# Patient Record
Sex: MALE | Race: WHITE
[De-identification: names, ages, dates, MRNs, and addresses within clinical notes are randomized per-mention and may not be internally consistent; named-entity substitution may affect disease eponyms.]

---

## 2017-04-17 ENCOUNTER — HOSPITAL ENCOUNTER (EMERGENCY)
Dept: HOSPITAL 56 - MW.ED | Age: 62
Discharge: NURSING, REHAB OR LONG-TERM CARE | End: 2017-04-17
Payer: COMMERCIAL

## 2017-04-17 VITALS — SYSTOLIC BLOOD PRESSURE: 127 MMHG | DIASTOLIC BLOOD PRESSURE: 84 MMHG

## 2017-04-17 DIAGNOSIS — Z79.82: ICD-10-CM

## 2017-04-17 DIAGNOSIS — I21.4: Primary | ICD-10-CM

## 2017-04-17 DIAGNOSIS — E78.00: ICD-10-CM

## 2017-04-17 DIAGNOSIS — I48.91: ICD-10-CM

## 2017-04-17 DIAGNOSIS — Z79.899: ICD-10-CM

## 2017-04-17 LAB
CHLORIDE SERPL-SCNC: 108 MMOL/L (ref 98–110)
SODIUM SERPL-SCNC: 142 MMOL/L (ref 136–146)

## 2017-04-17 PROCEDURE — 80053 COMPREHEN METABOLIC PANEL: CPT

## 2017-04-17 PROCEDURE — 99285 EMERGENCY DEPT VISIT HI MDM: CPT

## 2017-04-17 PROCEDURE — 93005 ELECTROCARDIOGRAM TRACING: CPT

## 2017-04-17 PROCEDURE — 71010: CPT

## 2017-04-17 PROCEDURE — 84484 ASSAY OF TROPONIN QUANT: CPT

## 2017-04-17 PROCEDURE — 85610 PROTHROMBIN TIME: CPT

## 2017-04-17 PROCEDURE — 96374 THER/PROPH/DIAG INJ IV PUSH: CPT

## 2017-04-17 PROCEDURE — 36415 COLL VENOUS BLD VENIPUNCTURE: CPT

## 2017-04-17 PROCEDURE — 96372 THER/PROPH/DIAG INJ SC/IM: CPT

## 2017-04-17 PROCEDURE — 85025 COMPLETE CBC W/AUTO DIFF WBC: CPT

## 2017-04-17 RX ADMIN — NITROGLYCERIN SCH MG: 0.4 TABLET SUBLINGUAL at 19:19

## 2017-04-17 RX ADMIN — METOROPROLOL TARTRATE ONE: 5 INJECTION, SOLUTION INTRAVENOUS at 19:46

## 2017-04-17 RX ADMIN — NITROGLYCERIN SCH MG: 0.4 TABLET SUBLINGUAL at 19:25

## 2017-04-17 RX ADMIN — METOROPROLOL TARTRATE ONE MG: 5 INJECTION, SOLUTION INTRAVENOUS at 19:42

## 2017-04-17 RX ADMIN — NITROGLYCERIN SCH MG: 0.4 TABLET SUBLINGUAL at 19:12

## 2017-04-17 NOTE — EDM.PDOC
ED HPI GENERAL MEDICAL PROBLEM





- General


Chief Complaint: Chest Pain


Stated Complaint: PT HAS CHEST PAINS


Time Seen by Provider: 04/17/17 19:05





- History of Present Illness


INITIAL COMMENTS - FREE TEXT/NARRATIVE: 


HISTORY AND PHYSICAL:





History of present illness:


The patient is a 62-year-old male with history of high cholesterol and A. fib 

was been seen by cardiology at [facility] in [place] in the past for his 

atrial fibrillation and presents with midsternal chest pain pressure that 

started at 12 noon today. He says it's been coming and going and became more 

severe so he came in to be seen. He's having some shortness of breath but no 

lightheadedness no nausea no diaphoresis. He denies any trauma or recent upper 

respiratory infections. He has no abdominal pain. The patient is on metoprolol 

and Norpace for his A. fib. He rates the pain currently as 5/10 and it is not 

radiating. He points to his lower sternum as the area of discomfort. Patient 

has never had a heart catheter in the past and has no known coronary artery 

disease. He also has a history of hypercholesterolemia.





Review of systems: 


As per history of present illness and below otherwise all systems reviewed and 

negative.





Past medical history: 


As per history of present illness and as reviewed below otherwise 

noncontributory.





Surgical history: 


As per history of present illness and as reviewed below otherwise 

noncontributory.





Social history: 


No reported history of drug or alcohol abuse.





Family history: 


As per history of present illness and as reviewed below otherwise 

noncontributory.





Physical exam:


General: Well-developed well-nourished male who is nontoxic but looks 

uncomfortable and vital signs are noted by me. 


HEENT: Atraumatic normocephalic, pupils reactive, negative for conjunctival 

pallor or scleral icterus, mucous membranes moist, throat clear, neck supple, 

nontender, trachea midline.


Lungs: Clear to auscultation, breath sounds equal bilaterally, chest nontender. 

No work or breathing


Heart: S1S2, irregular and tachycardic on my evaluation initially, negative for 

clicks, rubs, or JVD.


Abdomen: Soft, nondistended, nontender. Negative for masses or 

hepatosplenomegaly. Negative for costovertebral tenderness.


Pelvis: Stable nontender.


Genitourinary: Deferred.


Rectal: Deferred.


Extremities: Atraumatic, negative for cords or calf pain. Neurovascular 

unremarkable. No pedal edema or leg asymmetry


Neuro: Awake, alert, oriented. Cranial nerves II through XII unremarkable. 

Cerebellum unremarkable. Motor and sensory unremarkable throughout. Exam 

nonfocal.





Diagnostics:


EKG x2 portable chest x-ray CBC CMP INR troponin





Therapeutics:


IV O2 monitor nitroglycerin sublingual aspirin Lopressor





1909: Our cardiologist Dr. PAGAN was contacted and the EKG was sent to him after 

obtaining permission from the patient. He has reviewed it and would like to 

slow the rate and reevaluate with a second EKG. he states is not completely 

clear that this is an acute injury from this initial EKG. He also at 1914 

recontacted me and asked if we could do an EKG looking at leads in V7 through 

V9. We are currently in the process of getting respiratory to do that.


1924: Dr. PAGAN is aware that we're having difficulties getting the EKG he is 

requesting so he is coming in to see the patient. Currently the patient states 

his pain is a 2/10 after one nitroglycerin and his heart rate is in the low 

100s. We will continue with the plan


1932: Repeat EKG shows normalization of the ST depression in V2 and V3 and 

slight increase in the sweep of the ST segment inferiorly but overall much 

improved. I've discussed the case with cardiology on call at [facility], 

Dr. Crespo. He is except the patient for direct admission to the ICU. He 

would like me to not to give any Plavix but only Lovenox 1 mg per kilogram, he 

would like nitro drip and 5 mg more of Lopressor. I've inform the patient that 

he will be transferred and he and wife are in agreement and concur. The patient 

currently is pain-free.


1944: Currently the patient's heart rate is 49-50 and she did not receive the 

second dose of 5 mg of Lopressor. He is currently pain-free and feels more 

relaxed. I will do a third EKG and we will attempt to do the posterior EKG


1945: Dr. PAGAN her cardiologist is here and is reviewing the 2 EKGs and we are 

currently doing the third. The patient's heart rate is still holding in the 

high 40s low 50s. He is not lightheaded or dizzy. We will arrange for flight 

team to come and take the patient to [facility]. Dr. PAGAN agrees that the 

patient should be flown to [facility].


1950: 30 EKG shows a sinus bradycardia with no ST segment changes but Dr. PAGAN 

still feels that the patient should be flown to  for acute 

management. Portable chest x-ray is currently being performed and I will review 

that and send the films to [place]


Impression: 


Acute non-STEMI MI with history of A. fib, 





Definitive disposition and diagnosis as appropriate pending reevaluation and 

review of above.


  ** Chest


Pain Score (Numeric/FACES): 5





- Related Data


 Allergies











Allergy/AdvReac Type Severity Reaction Status Date / Time


 


No Known Allergies Allergy   Verified 07/27/16 15:43











Home Meds: 


 Home Meds





Aspirin 162.5 mg PO DAILY 07/27/16 [History]


Metoprolol Tartrate 12.5 mg PO BEDTIME 07/27/16 [History]


Multivitamin [Daily Multiple Vitamin] 1 tab PO DAILY 07/27/16 [History]


Omega-3 Fatty Acids [Fish Oil] 1 cap PO DAILY 07/27/16 [History]


Simvastatin [Zocor] 20 mg PO BEDTIME 07/27/16 [History]


Disopyramide Phosphate 200 mg PO BID 04/17/17 [History]











Past Medical History


Other HEENT History: wears glasses


Cardiovascular History: Reports: Afib, High cholesterol


Other Cardiovascular History: has "attack" of A-Fib about once a month, lasts 

30 seconds to 1 minute


Respiratory History: Reports: Sleep apnea


Other Respiratory History: uses Bi-PAP


Gastrointestinal History: Reports: GERD


Genitourinary History: Reports: None


Musculoskeletal History: Reports: Back pain, chronic, Fracture


Neurological History: Reports: None


Psychiatric History: Reports: None


Endocrine/Metabolic History: Reports: None


Hematologic History: Reports: None


Immunologic History: Reports: None


Oncologic (Cancer) History: Reports: None


Dermatologic History: Reports: None





- Past Surgical History


Head Surgeries/Procedures: Reports: None


GI Surgical History: Reports: None


Male  Surgical History: Reports: None


Endocrine Surgical History: Reports: None


Neurological Surgical History: Reports: None


Other Musculoskeletal Surgeries/Procedures:: left leg


Oncologic Surgical History: Reports: None


Dermatological Surgical History: Reports: None





Social & Family History





- Tobacco Use


Smoking Status *Q: Never Smoker





- Recreational Drug Use


Recreational Drug Use: No


Drug Use in Last 12 Months: No





ED ROS GENERAL





- Review of Systems


Review Of Systems: ROS reveals no pertinent complaints other than HPI.





ED EXAM, GENERAL





- Physical Exam


Exam: See Below (See dictation)





Course





- Vital Signs


Last Recorded V/S: 


 Last Vital Signs











Temp  37.4 C   04/17/17 19:40


 


Pulse  87   04/17/17 19:42


 


Resp  20   04/17/17 19:40


 


BP  127/74   04/17/17 19:42


 


Pulse Ox  97   04/17/17 19:40














- Orders/Labs/Meds


Orders: 


 Active Orders 24 hr











 Category Date Time Status


 


 Cardiac Monitoring [RC] .AS DIRECTED Care  04/17/17 19:03 Active


 


 EKG Documentation Completion [RC] STAT Care  04/17/17 19:03 Active


 


 EKG Documentation Completion [RC] STAT Care  04/17/17 19:26 Active


 


 Oxygen Therapy, ED [RC] ASDIRECTED Care  04/17/17 19:03 Active


 


 Pulse Oximetry [RC] ASDIRECTED Care  04/17/17 19:03 Active


 


 Chest 1V Frontal [CR] Stat Exams  04/17/17 19:03 Ordered


 


 Nitroglycerin/D5W [Nitroglycerin  25 MG/D5W 250 ML] 250 Med  04/17/17 19:45 

Ordered





 ml   





 IV TITRATE   


 


 Sodium Chloride 0.9% [Saline Flush] Med  04/17/17 19:03 Active





 10 ml FLUSH ASDIRECTED PRN   


 


 Sodium Chloride 0.9% [Saline Flush] Med  04/17/17 19:03 Active





 2.5 ml FLUSH ASDIRECTED PRN   


 


 Saline Lock Insert [OM.PC] Stat Oth  04/17/17 19:03 Ordered








 Medication Orders





Nitroglycerin/Dextrose (Nitroglycerin  25 Mg/D5w 250 Ml)  250 mls @ 3 mls/hr IV 

TITRATE JOSE


   PRN Reason: 5 MCG/MIN


Sodium Chloride (Saline Flush)  10 ml FLUSH ASDIRECTED PRN


   PRN Reason: Keep Vein Open


Sodium Chloride (Saline Flush)  2.5 ml FLUSH ASDIRECTED PRN


   PRN Reason: Keep Vein Open








Labs: 


 Laboratory Tests











  04/17/17 04/17/17 04/17/17 Range/Units





  19:00 19:00 19:00 


 


WBC  7.42    (4.0-11.0)  K/uL


 


RBC  5.31    (4.50-5.90)  M/uL


 


Hgb  16.1    (13.0-17.0)  g/dL


 


Hct  44.9    (38.0-50.0)  %


 


MCV  84.6    (80.0-98.0)  fL


 


MCH  30.3    (27.0-32.0)  pg


 


MCHC  35.9    (31.0-37.0)  g/dL


 


RDW Std Deviation  39.7    (28.0-62.0)  fl


 


RDW Coeff of Rosalba  13    (11.0-15.0)  %


 


Plt Count  145 L    (150-400)  K/uL


 


MPV  11.40    (7.40-12.00)  fL


 


Neut % (Auto)  62.2    (48.0-80.0)  %


 


Lymph % (Auto)  28.4    (16.0-40.0)  %


 


Mono % (Auto)  7.5    (0.0-15.0)  %


 


Eos % (Auto)  1.8    (0.0-7.0)  %


 


Baso % (Auto)  0.1    (0.0-1.5)  %


 


Neut # (Auto)  4.6    (1.4-5.7)  K/uL


 


Lymph # (Auto)  2.1    (0.6-2.4)  K/uL


 


Mono # (Auto)  0.6    (0.0-0.8)  K/uL


 


Eos # (Auto)  0.1    (0.0-0.7)  K/uL


 


Baso # (Auto)  0.0    (0.0-0.1)  K/uL


 


Nucleated RBC %  0.0    /100WBC


 


Nucleated RBCs #  0    K/uL


 


INR   0.97   (0.86-1.11)  


 


Sodium    142  (136-146)  mmol/L


 


Potassium    3.7  (3.5-5.1)  mmol/L


 


Chloride    108  ()  mmol/L


 


Carbon Dioxide    23  (21-31)  mmol/L


 


BUN    16  (6.0-23.0)  mg/dL


 


Creatinine    1.1  (0.6-1.5)  mg/dL


 


Est Cr Clr Drug Dosing    81.99  mL/min


 


Estimated GFR (MDRD)    > 60.0  ml/min


 


Glucose    85  ()  mg/dL


 


Calcium    10.0  (8.8-10.8)  mg/dL


 


Total Bilirubin    0.8  (0.1-1.5)  mg/dL


 


AST    33  (5-40)  IU/L


 


ALT    56 H  (8-54)  IU/L


 


Alkaline Phosphatase    72  ()  


 


Troponin I     (0.0-0.29)  NG/ML


 


Total Protein    7.8  (6.0-8.0)  g/dL


 


Albumin    4.6  (3.4-4.8)  g/dL


 


Globulin    3.2  (2.0-3.5)  g/dL


 


Albumin/Globulin Ratio    1.4  (1.3-2.8)  














  04/17/17 Range/Units





  19:00 


 


WBC   (4.0-11.0)  K/uL


 


RBC   (4.50-5.90)  M/uL


 


Hgb   (13.0-17.0)  g/dL


 


Hct   (38.0-50.0)  %


 


MCV   (80.0-98.0)  fL


 


MCH   (27.0-32.0)  pg


 


MCHC   (31.0-37.0)  g/dL


 


RDW Std Deviation   (28.0-62.0)  fl


 


RDW Coeff of Rosalba   (11.0-15.0)  %


 


Plt Count   (150-400)  K/uL


 


MPV   (7.40-12.00)  fL


 


Neut % (Auto)   (48.0-80.0)  %


 


Lymph % (Auto)   (16.0-40.0)  %


 


Mono % (Auto)   (0.0-15.0)  %


 


Eos % (Auto)   (0.0-7.0)  %


 


Baso % (Auto)   (0.0-1.5)  %


 


Neut # (Auto)   (1.4-5.7)  K/uL


 


Lymph # (Auto)   (0.6-2.4)  K/uL


 


Mono # (Auto)   (0.0-0.8)  K/uL


 


Eos # (Auto)   (0.0-0.7)  K/uL


 


Baso # (Auto)   (0.0-0.1)  K/uL


 


Nucleated RBC %   /100WBC


 


Nucleated RBCs #   K/uL


 


INR   (0.86-1.11)  


 


Sodium   (136-146)  mmol/L


 


Potassium   (3.5-5.1)  mmol/L


 


Chloride   ()  mmol/L


 


Carbon Dioxide   (21-31)  mmol/L


 


BUN   (6.0-23.0)  mg/dL


 


Creatinine   (0.6-1.5)  mg/dL


 


Est Cr Clr Drug Dosing   mL/min


 


Estimated GFR (MDRD)   ml/min


 


Glucose   ()  mg/dL


 


Calcium   (8.8-10.8)  mg/dL


 


Total Bilirubin   (0.1-1.5)  mg/dL


 


AST   (5-40)  IU/L


 


ALT   (8-54)  IU/L


 


Alkaline Phosphatase   ()  


 


Troponin I  0.31 H*  (0.0-0.29)  NG/ML


 


Total Protein   (6.0-8.0)  g/dL


 


Albumin   (3.4-4.8)  g/dL


 


Globulin   (2.0-3.5)  g/dL


 


Albumin/Globulin Ratio   (1.3-2.8)  











Meds: 


Medications











Generic Name Dose Route Start Last Admin





  Trade Name Freq  PRN Reason Stop Dose Admin


 


Nitroglycerin/Dextrose  250 mls @ 3 mls/hr  04/17/17 19:45  





  Nitroglycerin  25 Mg/D5w 250 Ml  IV   





  TITRATE JOSE   





  5 MCG/MIN   


 


Sodium Chloride  10 ml  04/17/17 19:03  





  Saline Flush  FLUSH   





  ASDIRECTED PRN   





  Keep Vein Open   


 


Sodium Chloride  2.5 ml  04/17/17 19:03  





  Saline Flush  FLUSH   





  ASDIRECTED PRN   





  Keep Vein Open   














Discontinued Medications














Generic Name Dose Route Start Last Admin





  Trade Name Freq  PRN Reason Stop Dose Admin


 


Aspirin  324 mg  04/17/17 19:03  04/17/17 19:12





  Aspirin  PO  04/17/17 19:04  324 mg





  ONETIME ONE   Administration


 


Aspirin  Confirm  04/17/17 19:18  04/17/17 19:38





  Aspirin  Administered  04/17/17 19:19  Not Given





  Dose   





  81 mg   





  .ROUTE   





  .STK-MED ONE   


 


Enoxaparin Sodium  100 mg  04/17/17 19:37  04/17/17 19:42





  Lovenox  SUBCUT  04/17/17 19:38  100 mg





  ONETIME ONE   Administration


 


Metoprolol Tartrate  2.5 mg  04/17/17 19:14  04/17/17 19:22





  Lopressor  IVPUSH  04/17/17 19:15  2.5 mg





  ONETIME ONE   Administration


 


Metoprolol Tartrate  5 mg  04/17/17 19:37  04/17/17 19:42





  Lopressor  IVPUSH  04/17/17 19:38  5 mg





  ONETIME ONE   Administration


 


Nitroglycerin  0.4 mg  04/17/17 19:15  04/17/17 19:25





  Nitrostat  SL  04/17/17 19:26  0.4 mg





  Q5M JOSE   Administration














Departure





- Departure


Time of Disposition: 19:52


Disposition: DC/[facility]fer to [facility]


Condition: good


Clinical Impression: 


 Non-STEMI (non-ST elevated myocardial infarction)





Forms:  ED Department Discharge





- My Orders


Last 24 Hours: 


My Active Orders





04/17/17 19:03


Cardiac Monitoring [RC] .AS DIRECTED 


EKG Documentation Completion [RC] STAT 


Oxygen Therapy, ED [RC] ASDIRECTED 


Pulse Oximetry [RC] ASDIRECTED 


Chest 1V Frontal [CR] Stat 


Sodium Chloride 0.9% [Saline Flush]   10 ml FLUSH ASDIRECTED PRN 


Sodium Chloride 0.9% [Saline Flush]   2.5 ml FLUSH ASDIRECTED PRN 


Saline Lock Insert [OM.PC] Stat 





04/17/17 19:26


EKG Documentation Completion [RC] STAT 





04/17/17 19:45


Nitroglycerin/D5W [Nitroglycerin  25 MG/D5W 250 ML] 250 ml IV TITRATE 














- Assessment/Plan


Last 24 Hours: 


My Active Orders





04/17/17 19:03


Cardiac Monitoring [RC] .AS DIRECTED 


EKG Documentation Completion [RC] STAT 


Oxygen Therapy, ED [RC] ASDIRECTED 


Pulse Oximetry [RC] ASDIRECTED 


Chest 1V Frontal [CR] Stat 


Sodium Chloride 0.9% [Saline Flush]   10 ml FLUSH ASDIRECTED PRN 


Sodium Chloride 0.9% [Saline Flush]   2.5 ml FLUSH ASDIRECTED PRN 


Saline Lock Insert [OM.PC] Stat 





04/17/17 19:26


EKG Documentation Completion [RC] STAT 





04/17/17 19:45


Nitroglycerin/D5W [Nitroglycerin  25 MG/D5W 250 ML] 250 ml IV TITRATE

## 2017-04-18 NOTE — CR
EXAM DATE: 17



PATIENT'S AGE: 62





Patient: ISABELLE CABALLERO



Facility: [place], [place]

Patient ID: [id number]

Site Patient ID: [id number]

Site Accession #: [id number]

: 1955

Study: XRay Chest [id number]/17/2017 8:03:51 PM

Ordering Physician: AMINA



Final Report: 

HISTORY:

Chest pain.



Technique:

One view portable chest.



Comparison:

None.



Findings:

No airspace consolidation. No pleural effusion or pneumothorax. Pulmonary 
vasculature is within normal limits. Cardiomediastinal silhouette is within 
normal limits for technique.



Impression:

No acute findings.



Dictated by Freddy Madrigal MD @ 2017 8:07PM

(Electronic Signature)



Report Signed by Proxy and Original Signed Document filed in the

Medical Record.
MTDD

## 2018-04-22 ENCOUNTER — HOSPITAL ENCOUNTER (EMERGENCY)
Dept: HOSPITAL 56 - MW.ED | Age: 63
Discharge: HOME | End: 2018-04-22
Payer: COMMERCIAL

## 2018-04-22 VITALS — SYSTOLIC BLOOD PRESSURE: 111 MMHG | DIASTOLIC BLOOD PRESSURE: 71 MMHG

## 2018-04-22 DIAGNOSIS — E78.00: ICD-10-CM

## 2018-04-22 DIAGNOSIS — Z95.5: ICD-10-CM

## 2018-04-22 DIAGNOSIS — G47.30: ICD-10-CM

## 2018-04-22 DIAGNOSIS — M54.6: Primary | ICD-10-CM

## 2018-04-22 DIAGNOSIS — Z99.89: ICD-10-CM

## 2018-04-22 DIAGNOSIS — K21.9: ICD-10-CM

## 2018-04-22 DIAGNOSIS — Z79.899: ICD-10-CM

## 2018-04-22 DIAGNOSIS — R07.9: ICD-10-CM

## 2018-04-22 DIAGNOSIS — I48.91: ICD-10-CM

## 2018-04-22 DIAGNOSIS — Z79.82: ICD-10-CM

## 2018-04-22 LAB
CHLORIDE SERPL-SCNC: 110 MMOL/L (ref 98–107)
SODIUM SERPL-SCNC: 145 MMOL/L (ref 136–148)

## 2018-04-22 PROCEDURE — 85610 PROTHROMBIN TIME: CPT

## 2018-04-22 PROCEDURE — 84484 ASSAY OF TROPONIN QUANT: CPT

## 2018-04-22 PROCEDURE — 71045 X-RAY EXAM CHEST 1 VIEW: CPT

## 2018-04-22 PROCEDURE — 83690 ASSAY OF LIPASE: CPT

## 2018-04-22 PROCEDURE — 82553 CREATINE MB FRACTION: CPT

## 2018-04-22 PROCEDURE — 85025 COMPLETE CBC W/AUTO DIFF WBC: CPT

## 2018-04-22 PROCEDURE — 96361 HYDRATE IV INFUSION ADD-ON: CPT

## 2018-04-22 PROCEDURE — 82550 ASSAY OF CK (CPK): CPT

## 2018-04-22 PROCEDURE — 93005 ELECTROCARDIOGRAM TRACING: CPT

## 2018-04-22 PROCEDURE — 71260 CT THORAX DX C+: CPT

## 2018-04-22 PROCEDURE — 99284 EMERGENCY DEPT VISIT MOD MDM: CPT

## 2018-04-22 PROCEDURE — 96374 THER/PROPH/DIAG INJ IV PUSH: CPT

## 2018-04-22 PROCEDURE — 80053 COMPREHEN METABOLIC PANEL: CPT

## 2018-04-22 PROCEDURE — 74177 CT ABD & PELVIS W/CONTRAST: CPT

## 2018-04-22 RX ADMIN — NITROGLYCERIN PRN MG: 0.4 TABLET SUBLINGUAL at 06:39

## 2018-04-22 RX ADMIN — NITROGLYCERIN PRN MG: 0.4 TABLET SUBLINGUAL at 06:49

## 2018-04-22 RX ADMIN — NITROGLYCERIN PRN MG: 0.4 TABLET SUBLINGUAL at 06:44

## 2018-04-22 NOTE — EDM.PDOC
<Freddy Saucedo - Last Filed: 04/22/18 07:03>





ED HPI GENERAL MEDICAL PROBLEM





- General


Chief Complaint: Back Pain or Injury


Stated Complaint: BACK PAIN


Time Seen by Provider: 04/22/18 06:26


Source of Information: Reports: Patient





- History of Present Illness


INITIAL COMMENTS - FREE TEXT/NARRATIVE: 





HISTORY AND PHYSICAL:


History of present illness:


[Patient presents with 7 out of 10 chest pain radiating to his back he has 

history of atrial fibrillation post ablation last year as well as 6 stents 

placed last year, patient is quite anxious on arrival no safe association with 

shortness of breath or diaphoresis no radiation to arm neck or jaw





Patient had taken nitroglycerin at home With some improvement after first dose 

he did not take any subsequent dosing of nitroglycerin


]


Review of systems: 


As per history of present illness and below otherwise all systems reviewed and 

negative.


Past medical history: 


As per history of present illness and as reviewed below otherwise 

noncontributory.


Surgical history: 


As per history of present illness and as reviewed below otherwise 

noncontributory.


Social history: 


No reported history of drug or alcohol abuse.


Family history: 


As per history of present illness and as reviewed below otherwise 

noncontributory.





Physical exam:


HEENT: Atraumatic, normocephalic, pupils reactive, negative for conjunctival 

pallor or scleral icterus, mucous membranes moist, throat clear, neck supple, 

nontender, trachea midline.


Lungs: Clear to auscultation, breath sounds equal bilaterally, chest nontender.


Heart: S1S2, regular, negative for clicks, rubs, or JVD.


Abdomen: Soft, nondistended, nontender. Negative for masses or 

hepatosplenomegaly. Negative for costovertebral tenderness.


Pelvis: Stable nontender.


Genitourinary: Deferred.


Rectal: Deferred.


Extremities: Atraumatic, negative for cords or calf pain. Neurovascular 

unremarkable.


Neuro: Awake, alert, oriented. Cranial nerves II through XII unremarkable. 

Cerebellum unremarkable. Motor and sensory unremarkable throughout. Exam 

nonfocal.





Diagnostics:


[CBC CMP troponin lipase


EKG


Chest 1 view


]


Therapeutics:


 Normal saline 1 25 mL


[Patient had taken aspirin 325 mg 2 hours prior to arrival]


Nitroglycerin 0.4 sublingual every 5 minutes


Morphine 2 mg IV





Impression: 


[Chest pain


Chronic history at baseline]





Patient signed out to Dr. Boone at 7 AM follow lab and redirected and 

disposition


Definitive disposition and diagnosis as appropriate pending reevaluation and 

review of above.


  ** back pain


Pain Score (Numeric/FACES): 5





- Related Data


 Allergies











Allergy/AdvReac Type Severity Reaction Status Date / Time


 


No Known Allergies Allergy   Verified 04/22/18 06:25











Home Meds: 


 Home Meds





Aspirin 81 mg PO DAILY 07/27/16 [History]


Multivitamin [Daily Multiple Vitamin] 1 tab PO DAILY 07/27/16 [History]


Omega-3 Fatty Acids [Fish Oil] 1 cap PO DAILY 07/27/16 [History]


Nitroglycerin [Nitrostat] 1 tab SL ASDIRECTED PRN 04/22/18 [History]


Prasugrel [Effient] 10 mg PO DAILY 04/22/18 [History]


Ramipril 1 tab PO DAILY 04/22/18 [History]


Rosuvastatin [Crestor] 40 mg PO DAILY 04/22/18 [History]











Past Medical History


Other HEENT History: wears glasses


Cardiovascular History: Reports: Afib, High Cholesterol


Other Cardiovascular History: has "attack" of A-Fib about once a month, lasts 

30 seconds to 1 minute


Respiratory History: Reports: Sleep Apnea


Other Respiratory History: uses Bi-PAP


Gastrointestinal History: Reports: GERD


Genitourinary History: Reports: None


Musculoskeletal History: Reports: Back Pain, Chronic, Fracture


Neurological History: Reports: None


Psychiatric History: Reports: None


Endocrine/Metabolic History: Reports: None


Hematologic History: Reports: None


Immunologic History: Reports: None


Oncologic (Cancer) History: Reports: None


Dermatologic History: Reports: None





- Past Surgical History


Head Surgeries/Procedures: Reports: None


GI Surgical History: Reports: None


Male  Surgical History: Reports: None


Endocrine Surgical History: Reports: None


Neurological Surgical History: Reports: None


Other Musculoskeletal Surgeries/Procedures:: left leg


Oncologic Surgical History: Reports: None


Dermatological Surgical History: Reports: None





Social & Family History





- Tobacco Use


Smoking Status *Q: Never Smoker





- Recreational Drug Use


Recreational Drug Use: No


Drug Use in Last 12 Months: No





Course





- Vital Signs


Last Recorded V/S: 


 Last Vital Signs











Temp  36.0 C   04/22/18 08:55


 


Pulse  59 L  04/22/18 08:55


 


Resp  16   04/22/18 08:55


 


BP  102/59 L  04/22/18 08:55


 


Pulse Ox  97   04/22/18 08:55














- Orders/Labs/Meds


Orders: 


 Active Orders 24 hr











 Category Date Time Status


 


 EKG Documentation Completion [RC] STAT Care  04/22/18 06:25 Active


 


 Abdomen Pelvis w Cont [CT] Stat Exams  04/22/18 07:33 Taken


 


 Chest 1V Frontal [CR] Stat Exams  04/22/18 06:25 Taken


 


 Chest w Cont [CT] Stat Exams  04/22/18 07:33 Taken


 


 Sodium Chloride 0.9% [Normal Saline] 1,000 ml Med  04/22/18 06:30 Active





 IV STAT   








 Medication Orders





Sodium Chloride (Normal Saline)  1,000 mls @ 125 mls/hr IV STAT JOSE


   Last Admin: 04/22/18 06:33  Dose: 125 mls/hr








Labs: 


 Laboratory Tests











  04/22/18 04/22/18 04/22/18 Range/Units





  06:25 06:25 06:25 


 


WBC  5.24    (4.0-11.0)  K/uL


 


RBC  4.99    (4.50-5.90)  M/uL


 


Hgb  15.1    (13.0-17.0)  g/dL


 


Hct  42.0    (38.0-50.0)  %


 


MCV  84.2    (80.0-98.0)  fL


 


MCH  30.3    (27.0-32.0)  pg


 


MCHC  36.0    (31.0-37.0)  g/dL


 


RDW Std Deviation  39.3    (28.0-62.0)  fl


 


RDW Coeff of Rosalba  13    (11.0-15.0)  %


 


Plt Count  138 L    (150-400)  K/uL


 


MPV  11.10    (7.40-12.00)  fL


 


Neut % (Auto)  49.2    (48.0-80.0)  %


 


Lymph % (Auto)  36.5    (16.0-40.0)  %


 


Mono % (Auto)  10.1    (0.0-15.0)  %


 


Eos % (Auto)  4.0    (0.0-7.0)  %


 


Baso % (Auto)  0.2    (0.0-1.5)  %


 


Neut # (Auto)  2.6    (1.4-5.7)  K/uL


 


Lymph # (Auto)  1.9    (0.6-2.4)  K/uL


 


Mono # (Auto)  0.5    (0.0-0.8)  K/uL


 


Eos # (Auto)  0.2    (0.0-0.7)  K/uL


 


Baso # (Auto)  0.0    (0.0-0.1)  K/uL


 


Nucleated RBC %  0.0    /100WBC


 


Nucleated RBCs #  0    K/uL


 


INR    0.98  


 


Sodium   145   (136-148)  mmol/L


 


Potassium   4.0   (3.5-5.1)  mmol/L


 


Chloride   110 H   ()  mmol/L


 


Carbon Dioxide   24.5   (21.0-32.0)  mmol/L


 


BUN   11   (7.0-18.0)  mg/dL


 


Creatinine   1.0   (0.8-1.3)  mg/dL


 


Est Cr Clr Drug Dosing   89.05   mL/min


 


Estimated GFR (MDRD)   > 60.0   ml/min


 


Glucose   109 H   ()  mg/dL


 


Calcium   9.7   (8.5-10.1)  mg/dL


 


Total Bilirubin   0.3   (0.2-1.0)  mg/dL


 


AST   18   (15-37)  IU/L


 


ALT   31   (14-63)  IU/L


 


Alkaline Phosphatase   72   ()  U/L


 


Creatine Kinase     ()  U/L


 


CK-MB (CK-2)     (0-3.6)  ng/mL


 


Troponin I   < 0.050   (0.000-0.056)  ng/mL


 


Total Protein   7.4   (6.4-8.2)  g/dL


 


Albumin   4.0   (3.4-5.0)  g/dL


 


Globulin   3.4   (2.0-3.5)  g/dL


 


Albumin/Globulin Ratio   1.2 L   (1.3-2.8)  


 


Lipase   167   ()  U/L














  04/22/18 Range/Units





  06:25 


 


WBC   (4.0-11.0)  K/uL


 


RBC   (4.50-5.90)  M/uL


 


Hgb   (13.0-17.0)  g/dL


 


Hct   (38.0-50.0)  %


 


MCV   (80.0-98.0)  fL


 


MCH   (27.0-32.0)  pg


 


MCHC   (31.0-37.0)  g/dL


 


RDW Std Deviation   (28.0-62.0)  fl


 


RDW Coeff of Rosalba   (11.0-15.0)  %


 


Plt Count   (150-400)  K/uL


 


MPV   (7.40-12.00)  fL


 


Neut % (Auto)   (48.0-80.0)  %


 


Lymph % (Auto)   (16.0-40.0)  %


 


Mono % (Auto)   (0.0-15.0)  %


 


Eos % (Auto)   (0.0-7.0)  %


 


Baso % (Auto)   (0.0-1.5)  %


 


Neut # (Auto)   (1.4-5.7)  K/uL


 


Lymph # (Auto)   (0.6-2.4)  K/uL


 


Mono # (Auto)   (0.0-0.8)  K/uL


 


Eos # (Auto)   (0.0-0.7)  K/uL


 


Baso # (Auto)   (0.0-0.1)  K/uL


 


Nucleated RBC %   /100WBC


 


Nucleated RBCs #   K/uL


 


INR   


 


Sodium   (136-148)  mmol/L


 


Potassium   (3.5-5.1)  mmol/L


 


Chloride   ()  mmol/L


 


Carbon Dioxide   (21.0-32.0)  mmol/L


 


BUN   (7.0-18.0)  mg/dL


 


Creatinine   (0.8-1.3)  mg/dL


 


Est Cr Clr Drug Dosing   mL/min


 


Estimated GFR (MDRD)   ml/min


 


Glucose   ()  mg/dL


 


Calcium   (8.5-10.1)  mg/dL


 


Total Bilirubin   (0.2-1.0)  mg/dL


 


AST   (15-37)  IU/L


 


ALT   (14-63)  IU/L


 


Alkaline Phosphatase   ()  U/L


 


Creatine Kinase  95  ()  U/L


 


CK-MB (CK-2)  1.0  (0-3.6)  ng/mL


 


Troponin I   (0.000-0.056)  ng/mL


 


Total Protein   (6.4-8.2)  g/dL


 


Albumin   (3.4-5.0)  g/dL


 


Globulin   (2.0-3.5)  g/dL


 


Albumin/Globulin Ratio   (1.3-2.8)  


 


Lipase   ()  U/L











Meds: 


Medications











Generic Name Dose Route Start Last Admin





  Trade Name Freq  PRN Reason Stop Dose Admin


 


Sodium Chloride  1,000 mls @ 125 mls/hr  04/22/18 06:30  04/22/18 06:33





  Normal Saline  IV   125 mls/hr





  STAT JOSE   Administration





     





     





     





     














Discontinued Medications














Generic Name Dose Route Start Last Admin





  Trade Name Freq  PRN Reason Stop Dose Admin


 


Iopamidol  200 ml  04/22/18 07:54  04/22/18 07:54





  Isovue Multipack-370 (76%)  IVPUSH  04/22/18 07:55  110 ml





  ONETIME STA   Administration





     





     





     





     


 


Morphine Sulfate  2 mg  04/22/18 06:35  04/22/18 06:54





  Morphine  IVPUSH  04/22/18 06:36  2 mg





  ONETIME ONE   Administration





     





     





     





     


 


Nitroglycerin  0.4 mg  04/22/18 06:35  04/22/18 06:49





  Nitrostat  SL   0.4 mg





  Q5M PRN   Administration





  Chest Pain   





     





     





     














Departure





- Departure


Disposition: Home, Self-Care 01


Clinical Impression: 


Thoracic back pain


Qualifiers:


 Chronicity: acute Back pain laterality: right Qualified Code(s): M54.6 - Pain 

in thoracic spine








- Discharge Information


Referrals: 


Ángel Nagy MD [Primary Care Provider] - 


Forms:  ED Department Discharge


Additional Instructions: 


The following information is given to patients seen in the emergency department 

who are being discharged to home. This information is to outline your options 

for follow-up care. We provide all patients seen in our emergency department 

with a follow-up referral.





The need for follow-up, as well as the timing and circumstances, are variable 

depending upon the specifics of your emergency department visit.





If you don't have a primary care physician on staff, we will provide you with a 

referral. We always advise you to contact your personal physician following an 

emergency department visit to inform them of the circumstance of the visit and 

for follow-up with them and/or the need for any referrals to a consulting 

specialist.





The emergency department will also refer you to a specialist when appropriate. 

This referral assures that you have the opportunity for followup care with a 

specialist. All of these measure are taken in an effort to provide you with 

optimal care, which includes your followup.





Under all circumstances we always encourage you to contact your private 

physician who remains a resource for coordinating  your care. When calling for 

followup care, please make the office aware that this follow-up is from your 

recent emergency room visit. If for any reason you are refused follow-up, 

please contact the [facility] emergency 

department at [phone] and ask to speak to the emergency department 

charge nurse.





[facility]


[address]


[place] [phone]


[phone]








Please contact and follow-up with your provider at [facility] on Monday or 

Tuesday and return to ER as needed and as we discussed. Use medications as 

prescribed and needed and try to rest and do only small activities next few 

days.





- My Orders


Last 24 Hours: 


My Active Orders





04/22/18 07:33


Abdomen Pelvis w Cont [CT] Stat 


Chest w Cont [CT] Stat 














- Assessment/Plan


Last 24 Hours: 


My Active Orders





04/22/18 07:33


Abdomen Pelvis w Cont [CT] Stat 


Chest w Cont [CT] Stat 














<Mel Sumner - Last Filed: 04/22/18 09:21>





ED HPI GENERAL MEDICAL PROBLEM





- History of Present Illness


INITIAL COMMENTS - FREE TEXT/NARRATIVE: 





This is Dr. Sumner dictating an addendum note as I seem care of this case at 7 

AM. The patient complains to me of right flank and thoracic back pain and has 

no pain on the left. I took care of him when he was transferred for his acute 

MI and he does have the history of the A. fib ablation. When he saw his 

cardiologist on Friday he mentioned that he had this posterior back pain but 

they were more focusing on his heart checkup. The patient denies any history of 

gallbladder or kidney issues and denies any recent trauma. He has no shortness 

of breath or upper respiratory symptoms. He currently points to me physically 

to the right of the thoracic spine in the flank area as the area and location 

of his pain. He has been given medications for pain and I will reevaluate that 

as well as do a CT scan of the chest abdomen and pelvis to investigate other 

noncardiac causes of this discomfort. He has a history of hypercholesterolemia 

and does take Effient. Patient received nitroglycerin here as well as taking 

nitroglycerin at home and there was no change in his discomfort and he took 

aspirin 325 prior to arrival. He has received morphine here. The patient is 

currently very stable and we will reevaluate after imaging





The patient is aware of all testing results being negative and he currently 

states that after the morphine his pain is significantly improved. I've offered 

him an observation admission for a cardiac rule out even though his pain is on 

the right side in light of his recent history or follow up with his provider at 

[facility] tomorrow and pain management of his right flank/upper back pain. 

He will discuss this with his wife who was on her way back to the ER.





I discussed all testing results with the patient and wife at bedside and again 

have reoffered observation admission versus follow-up at home and the patient 

would like to not be admitted at this time. He would like to follow-up with his 

provider at [facility], Dr. Nagy as well as his chiropractor. He says he 

just saw his cardiologist on Friday in the did not think much of his back pain 

so he would like to try to go home. He was strongly advised to return to the ER 

if the pain evolved change or developed and I will give him a few pain pills as 

the morphine seems to help his discomfort. He promises that he will return to 

the ER for changes and as we discussed.





Impression: Right thoracic back pain with significant cardiac history etiology 

unclear stable





ED ROS GENERAL





- Review of Systems


Review Of Systems: ROS reveals no pertinent complaints other than HPI.





ED EXAM, GENERAL





- Physical Exam


Exam: See Below (See dictation)





Departure





- Departure


Time of Disposition: 09:21


Condition: Good

## 2018-04-23 NOTE — CR
EXAM DATE: 18



PATIENT'S AGE: 63





Patient: ISABELLE CABALLERO



Facility: [place], [place]

Patient ID: [id number]

Site Patient ID: [id number].

Site Accession #: [id number]

: 1955

Study: XRay Chest [id number]/22/2018 6:53:17 AM

Ordering Physician: Doctor Patel



Final Report: 

HISTORY:

Chest pain.



COMPARISON:

2017.



FINDINGS:

Single frontal view of the chest. The lungs are clear. No evidence for 
pneumonia. Heart size and pulmonary vascularity are within normal limits. 
Costophrenic angles sharp. The bony thorax appears intact.





Dictated by Gladis Mcmahon MD @ 2018 7:12AM

(Electronic Signature)



Report Signed by Proxy.
MADY

## 2018-04-23 NOTE — CT
EXAM DATE: 18



PATIENT'S AGE: 63





Patient: ISABELLE CABALLERO



Facility: [place], [place]

Patient ID: [id number]

Site Patient ID: [id number].

Site Accession #: [id number]

: 1955

Study: CT Abdomen/Pelvis WITH [id number]/22/2018 8:15:45 AM

Ordering Physician: Jack Hayes



Final Report: 

HISTORY:

Right-sided flank pain and generalized abdominal pain.



COMPARISON:

None.



TECHNIQUE:

Axial images were obtained through the abdomen and pelvis following intravenous 
contrast.



FINDINGS:

The lung bases are clear. The liver, spleen, pancreas, gallbladder, adrenal 
glands and kidneys are within normal. No hydronephrosis. The bowel is normal in 
caliber. The appendix is normal. No lymphadenopathy or ascites. The abdominal 
aorta is normal in caliber with minimal atherosclerotic change. Minimal 
degenerative changes in the spine.



IMPRESSION:

No acute abnormality.



Please note that all CT scans at this facility use dose modulation, iterative 
reconstruction, and/or weight-based dosing when appropriate to reduce radiation 
dose to as low as reasonably achievable.



Dictated by Gladis Mcmahon MD @ 2018 8:22AM

(Electronic Signature)



Report Signed by Proxy.
[facility]

## 2019-02-05 ENCOUNTER — HOSPITAL ENCOUNTER (EMERGENCY)
Dept: HOSPITAL 56 - MW.ED | Age: 64
Discharge: HOME | End: 2019-02-05
Payer: COMMERCIAL

## 2019-02-05 VITALS — DIASTOLIC BLOOD PRESSURE: 57 MMHG | SYSTOLIC BLOOD PRESSURE: 98 MMHG

## 2019-02-05 DIAGNOSIS — J10.1: Primary | ICD-10-CM

## 2019-02-05 DIAGNOSIS — E78.00: ICD-10-CM

## 2019-02-05 DIAGNOSIS — K21.9: ICD-10-CM

## 2019-02-05 DIAGNOSIS — I48.91: ICD-10-CM

## 2019-02-05 DIAGNOSIS — E86.0: ICD-10-CM

## 2019-02-05 DIAGNOSIS — Z79.899: ICD-10-CM

## 2019-02-05 DIAGNOSIS — Z79.82: ICD-10-CM

## 2019-02-05 DIAGNOSIS — N39.0: ICD-10-CM

## 2019-02-05 PROCEDURE — 87088 URINE BACTERIA CULTURE: CPT

## 2019-02-05 PROCEDURE — 96361 HYDRATE IV INFUSION ADD-ON: CPT

## 2019-02-05 PROCEDURE — 81001 URINALYSIS AUTO W/SCOPE: CPT

## 2019-02-05 PROCEDURE — 99283 EMERGENCY DEPT VISIT LOW MDM: CPT

## 2019-02-05 PROCEDURE — 96375 TX/PRO/DX INJ NEW DRUG ADDON: CPT

## 2019-02-05 PROCEDURE — 87086 URINE CULTURE/COLONY COUNT: CPT

## 2019-02-05 PROCEDURE — 87186 SC STD MICRODIL/AGAR DIL: CPT

## 2019-02-05 PROCEDURE — 96365 THER/PROPH/DIAG IV INF INIT: CPT

## 2019-02-05 NOTE — EDM.PDOC
ED HPI GENERAL MEDICAL PROBLEM





- General


Chief Complaint: General


Stated Complaint: FLU SYMPTOMS


Time Seen by Provider: 02/05/19 15:56





- History of Present Illness


INITIAL COMMENTS - FREE TEXT/NARRATIVE: 





HISTORY AND PHYSICAL:





History of present illness:


The patient is a 63-year-old male with a significant past medical history of 

hypertension hypercholesterolemia A. fib and an stemming for which I took care 

of him, and who presents today after being seen and evaluated at [facility] 

for fever and body aches nausea vomiting and slight cough as well as dysuria. 

The patient had labs and a chest x-ray done at [facility] and was sent over 

here by the provider for IV fluid hydration. I reviewed the labs including a 

CBC UA and CMP and the chest x-ray results. The UA has clumps of WBC., Bacteria 

large ketones and an elevated specific gravity and the chest x-ray was read as 

normal. The patient also had an influenza screen which was positive for 

influenza A. The patient says he did not get his flu shot this year. Here in 

the ER he says he just is complaining of diffuse body aches and malaise and has 

had fevers at home that respond to medication. He's had a slight cough and some 

congestion but not significant. He has no abdominal pain or diarrhea and says 

that he can't keep any fluids down and the vomiting only started today. He also 

has pain with urination and the urine is very dark. He has no chest pain or 

cardiac complaints.





Review of systems: 


As per history of present illness and below otherwise all systems reviewed and 

negative.





Past medical history: 


As per history of present illness and as reviewed below otherwise 

noncontributory.





Surgical history: 


As per history of present illness and as reviewed below otherwise 

noncontributory.





Social history: 


No reported history of drug or alcohol abuse.





Family history: 


As per history of present illness and as reviewed below otherwise 

noncontributory.





Physical exam:


General: Well-developed well-nourished mildly overweight man who is nontoxic 

and vital signs were noted by me.


HEENT: Atraumatic, normocephalic,  negative for conjunctival pallor or scleral 

icterus, mucous membranes moist, throat clear, neck supple, nontender, trachea 

midline. There is no cervical adenopathy or nuchal rigidity


Lungs: Clear to auscultation, breath sounds equal bilaterally, chest nontender. 

No wheezing stridor or work of breathing


Heart: S1S2, regular rate and rhythm no murmurs


Abdomen: Soft, nondistended, nontender. Negative for masses or 

hepatosplenomegaly. Negative for costovertebral tenderness.


Pelvis: Deferred


Genitourinary: Deferred.


Rectal: Deferred.


Extremities: Atraumatic, negative for cords or calf pain. Neurovascular 

unremarkable.


Neuro: Awake, alert, oriented. Cranial nerves II through XII unremarkable. 

Cerebellum unremarkable. Motor and sensory unremarkable throughout. Exam 

nonfocal.





Diagnostics:


Patient had CBC CMP influenza screen UA and chest x-ray done at [facility] 

and I have added a repeat UA with reflex culture as I am not sure for culture 

was sent from [facility]





Therapeutics:


IV fluids Zofran Toradol Tylenol Levaquin





Patient and spouse at bedside are aware of prescriptions for home including 

Tamiflu Pyridium Levaquin and Zofran. The patient was to try a popsicle and is 

feeling much less nauseated now. He is receiving a finishing his second bolus 

of 500 mL and will receive a dose of Levaquin prior to departure. His fever has 

broken is currently 99. Advised the patient to eat popsicles and push hydration 

and bites of bland food. I've also advised that he take Tylenol and Motrin 

around-the-clock to help with his fever management





Impression: 


Influenza A, UTI, vomiting and dehydration





Definitive disposition and diagnosis as appropriate pending reevaluation and 

review of above.





- Related Data


 Allergies











Allergy/AdvReac Type Severity Reaction Status Date / Time


 


No Known Allergies Allergy   Verified 02/05/19 16:09











Home Meds: 


 Home Meds





Aspirin 81 mg PO DAILY 07/27/16 [History]


Multivitamin [Daily Multiple Vitamin] 1 tab PO BID 07/27/16 [History]


Omega-3 Fatty Acids [Fish Oil] 1 cap PO DAILY 07/27/16 [History]


Nitroglycerin [Nitrostat] 1 tab SL ASDIRECTED PRN 04/22/18 [History]


Rosuvastatin [Crestor] 40 mg PO DAILY 04/22/18 [History]


Cholecalciferol (Vitamin D3) [Vitamin D3] 1,000 units PO DAILY 02/05/19 [History

]


Loratadine [Claritin] 10 mg PO ASDIRECTED 02/05/19 [History]


Metoprolol Tartrate [Lopressor] 12.5 mg PO BID 02/05/19 [History]


Sildenafil [Revatio] 50 - 100 mg PO ASDIRECTED 02/05/19 [History]











Past Medical History


Other HEENT History: wears glasses


Cardiovascular History: Reports: Afib, High Cholesterol


Other Cardiovascular History: has "attack" of A-Fib about once a month, lasts 

30 seconds to 1 minute


Respiratory History: Reports: Sleep Apnea


Other Respiratory History: uses Bi-PAP


Gastrointestinal History: Reports: GERD


Genitourinary History: Reports: None


Musculoskeletal History: Reports: Back Pain, Chronic, Fracture


Neurological History: Reports: None


Psychiatric History: Reports: None


Endocrine/Metabolic History: Reports: None


Hematologic History: Reports: None


Immunologic History: Reports: None


Oncologic (Cancer) History: Reports: None


Dermatologic History: Reports: None





- Infectious Disease History


Infectious Disease History: Reports: None





- Past Surgical History


Head Surgeries/Procedures: Reports: None


GI Surgical History: Reports: None


Male  Surgical History: Reports: None


Endocrine Surgical History: Reports: None


Neurological Surgical History: Reports: None


Other Musculoskeletal Surgeries/Procedures:: left leg


Oncologic Surgical History: Reports: None


Dermatological Surgical History: Reports: None





Social & Family History





- Family History


Family Medical History: Noncontributory





ED ROS GENERAL





- Review of Systems


Review Of Systems: ROS reveals no pertinent complaints other than HPI.





ED EXAM, GENERAL





- Physical Exam


Exam: See Below (See dictation)





Course





- Vital Signs


Last Recorded V/S: 


 Last Vital Signs











Temp  37.3 C   02/05/19 17:10


 


Pulse  99   02/05/19 16:06


 


Resp  18   02/05/19 16:06


 


BP  131/69   02/05/19 16:06


 


Pulse Ox  95   02/05/19 16:06














- Orders/Labs/Meds


Orders: 


 Active Orders 24 hr











 Category Date Time Status


 


 CULTURE URINE [RM] Stat Lab  02/05/19 16:23 Received


 


 Levofloxacin/Dextrose 5%-Water [Levaquin in D5W 500 MG/ Med  02/05/19 17:57 

Ordered





 100 ML] 500 mg   





 Premix Bag 1 bag   





 IV ONETIME   


 


 Sodium Chloride 0.9% [Normal Saline] 500 ml Med  02/05/19 17:41 Active





 IV ONETIME   


 


 Sodium Chloride 0.9% [Saline Flush] Med  02/05/19 16:10 Active





 10 ml FLUSH ASDIRECTED PRN   


 


 Sodium Chloride 0.9% [Saline Flush] Med  02/05/19 16:10 Active





 2.5 ml FLUSH ASDIRECTED PRN   


 


 Saline Lock Insert [OM.PC] Stat Oth  02/05/19 16:10 Ordered








 Medication Orders





Sodium Chloride (Normal Saline)  500 mls @ 999 mls/hr IV ONETIME ONE


   Stop: 02/05/19 18:11


   Last Admin: 02/05/19 17:42  Dose: 999 mls/hr


Levofloxacin/Dextrose 500 mg/ (Premix)  100 mls @ 100 mls/hr IV ONETIME ONE


   Stop: 02/05/19 18:56


   Last Admin: 02/05/19 18:03  Dose: 100 mls/hr


Sodium Chloride (Saline Flush)  10 ml FLUSH ASDIRECTED PRN


   PRN Reason: Keep Vein Open


Sodium Chloride (Saline Flush)  2.5 ml FLUSH ASDIRECTED PRN


   PRN Reason: Keep Vein Open








Labs: 


 Laboratory Tests











  02/05/19 Range/Units





  16:23 


 


Urine Color  BROWN  


 


Urine Appearance  CLOUDY  


 


Urine pH  5.0  (5.0-8.0)  


 


Ur Specific Gravity  >= 1.030  (1.001-1.035)  


 


Urine Protein  >=300 H  (NEGATIVE)  mg/dL


 


Urine Glucose (UA)  NEGATIVE  (NEGATIVE)  mg/dL


 


Urine Ketones  >=80  (NEGATIVE)  mg/dL


 


Urine Occult Blood  LARGE H  (NEGATIVE)  


 


Urine Nitrite  POSITIVE H  (NEGATIVE)  


 


Urine Bilirubin  MODERATE H  (NEGATIVE)  


 


Urine Urobilinogen  1.0  (<2.0)  EU/dL


 


Ur Leukocyte Esterase  SMALL H  (NEGATIVE)  


 


Urine RBC  350-450  (0-2/HPF)  


 


Urine WBC  150-200  (0-5/HPF)  


 


Ur Epithelial Cells  RARE  (NONE-FEW)  


 


Amorphous Sediment  FEW  (NEGATIVE)  


 


Urine Bacteria  2+ H  (NEGATIVE)  


 


Urine Mucus  FEW  (NONE-MOD)  


 


Urine Trichomonas    (NEGATIVE)  











Meds: 


Medications











Generic Name Dose Route Start Last Admin





  Trade Name Freq  PRN Reason Stop Dose Admin


 


Sodium Chloride  500 mls @ 999 mls/hr  02/05/19 17:41  02/05/19 17:42





  Normal Saline  IV  02/05/19 18:11  999 mls/hr





  ONETIME ONE   Administration





     





     





     





     


 


Levofloxacin/Dextrose 500 mg/  100 mls @ 100 mls/hr  02/05/19 17:57  02/05/19 18

:03





  Premix  IV  02/05/19 18:56  100 mls/hr





  ONETIME ONE   Administration





     





     





     





     


 


Sodium Chloride  10 ml  02/05/19 16:10  





  Saline Flush  FLUSH   





  ASDIRECTED PRN   





  Keep Vein Open   





     





     





     


 


Sodium Chloride  2.5 ml  02/05/19 16:10  





  Saline Flush  FLUSH   





  ASDIRECTED PRN   





  Keep Vein Open   





     





     





     














Discontinued Medications














Generic Name Dose Route Start Last Admin





  Trade Name Freq  PRN Reason Stop Dose Admin


 


Acetaminophen  1,000 mg  02/05/19 16:11  02/05/19 16:40





  Tylenol Extra Strength  PO  02/05/19 16:12  1,000 mg





  ONETIME ONE   Administration





     





     





     





     


 


Sodium Chloride  1,000 mls @ 999 mls/hr  02/05/19 16:10  02/05/19 16:35





  Normal Saline  IV  02/05/19 17:10  999 mls/hr





  STAT ONE   Administration





     





     





     





     


 


Ketorolac Tromethamine  30 mg  02/05/19 16:10  02/05/19 16:40





  Toradol  IVPUSH  02/05/19 16:11  30 mg





  ONETIME ONE   Administration





     





     





     





     


 


Ondansetron HCl  4 mg  02/05/19 16:10  02/05/19 16:36





  Zofran  IVPUSH  02/05/19 16:11  4 mg





  ONETIME ONE   Administration





     





     





     





     


 


Phenazopyridine HCl  200 mg  02/05/19 17:57  02/05/19 18:03





  Pyridium  PO  02/05/19 17:58  200 mg





  ONETIME ONE   Administration





     





     





     





     














Departure





- Departure


Time of Disposition: 18:05


Disposition: Home, Self-Care 01


Condition: Good


Clinical Impression: 


 Influenza A, Dehydration





UTI (urinary tract infection)


Qualifiers:


 Urinary tract infection type: site unspecified Hematuria presence: without 

hematuria Qualified Code(s): N39.0 - Urinary tract infection, site not specified





Vomiting


Qualifiers:


 Vomiting type: unspecified Vomiting Intractability: non-intractable Nausea 

presence: with nausea Qualified Code(s): R11.2 - Nausea with vomiting, 

unspecified








- Discharge Information


Referrals: 


Ángel Nagy MD [Primary Care Provider] - 


Forms:  ED Department Discharge


Additional Instructions: 


The following information is given to patients seen in the emergency department 

who are being discharged to home. This information is to outline your options 

for follow-up care. We provide all patients seen in our emergency department 

with a follow-up referral.





The need for follow-up, as well as the timing and circumstances, are variable 

depending upon the specifics of your emergency department visit.





If you don't have a primary care physician on staff, we will provide you with a 

referral. We always advise you to contact your personal physician following an 

emergency department visit to inform them of the circumstance of the visit and 

for follow-up with them and/or the need for any referrals to a consulting 

specialist.





The emergency department will also refer you to a specialist when appropriate. 

This referral assures that you have the opportunity for followup care with a 

specialist. All of these measure are taken in an effort to provide you with 

optimal care, which includes your followup.





Under all circumstances we always encourage you to contact your private 

physician who remains a resource for coordinating  your care. When calling for 

followup care, please make the office aware that this follow-up is from your 

recent emergency room visit. If for any reason you are refused follow-up, 

please contact the [facility] emergency 

department at [phone] and ask to speak to the emergency department 

charge nurse.





[address]


[place] [address]


[phone]








Push hydration with small sips of fluid and ice chips and popsicles as we 

discussed and small bites of bland food. Use Zofran as you need for nausea and 

vomiting. Please start the [employer] tomorrow and take the Tamiflu and 

Pyridium as directed. These call and schedule a follow-up with your provider in 

the clinic and return to ER as needed and as discussed.





- My Orders


Last 24 Hours: 


My Active Orders





02/05/19 16:10


Sodium Chloride 0.9% [Saline Flush]   10 ml FLUSH ASDIRECTED PRN 


Sodium Chloride 0.9% [Saline Flush]   2.5 ml FLUSH ASDIRECTED PRN 


Saline Lock Insert [OM.PC] Stat 





02/05/19 16:23


CULTURE URINE [RM] Stat 





02/05/19 17:41


Sodium Chloride 0.9% [Normal Saline] 500 ml IV ONETIME 





02/05/19 17:57


Levofloxacin/Dextrose 5%-Water [Levaquin in D5W 500 MG/100 ML] 500 mg   Premix 

Bag 1 bag IV ONETIME 














- Assessment/Plan


Last 24 Hours: 


My Active Orders





02/05/19 16:10


Sodium Chloride 0.9% [Saline Flush]   10 ml FLUSH ASDIRECTED PRN 


Sodium Chloride 0.9% [Saline Flush]   2.5 ml FLUSH ASDIRECTED PRN 


Saline Lock Insert [OM.PC] Stat 





02/05/19 16:23


CULTURE URINE [RM] Stat 





02/05/19 17:41


Sodium Chloride 0.9% [Normal Saline] 500 ml IV ONETIME 





02/05/19 17:57


Levofloxacin/Dextrose 5%-Water [Levaquin in D5W 500 MG/100 ML] 500 mg   Premix 

Bag 1 bag IV ONETIME